# Patient Record
Sex: MALE | Race: WHITE | NOT HISPANIC OR LATINO | Employment: FULL TIME | URBAN - NONMETROPOLITAN AREA
[De-identification: names, ages, dates, MRNs, and addresses within clinical notes are randomized per-mention and may not be internally consistent; named-entity substitution may affect disease eponyms.]

---

## 2017-01-12 ENCOUNTER — TELEPHONE (OUTPATIENT)
Dept: GASTROENTEROLOGY | Facility: CLINIC | Age: 42
End: 2017-01-12

## 2017-01-12 ENCOUNTER — DOCUMENTATION (OUTPATIENT)
Dept: CARDIOLOGY | Facility: CLINIC | Age: 42
End: 2017-01-12

## 2017-01-12 NOTE — TELEPHONE ENCOUNTER
Veronica from Dr. Jackson's office called. I explained we had tried to contact the patient concerning setting up an appointment based on the referral from Dr. Jackson. We had tried on 5 occasions and had left messages without any response. She promised to note their chart that the patient has failed to make an appointment for evaluation.

## 2017-01-12 NOTE — PROGRESS NOTES
Spoke with Kayleen at Dr. Alicea's office. They have attempted to reach patient 4 times (left message to return call) and still unable to make contact regarding gastroenterology referral.

## 2017-01-25 ENCOUNTER — TELEPHONE (OUTPATIENT)
Dept: CARDIOLOGY | Facility: CLINIC | Age: 42
End: 2017-01-25

## 2017-01-25 NOTE — TELEPHONE ENCOUNTER
Vicki from Dr. Alicea's office called to let us know they have been unable to reach Patient to schedule an apt.

## 2017-10-03 ENCOUNTER — OFFICE VISIT (OUTPATIENT)
Dept: PULMONOLOGY | Facility: CLINIC | Age: 42
End: 2017-10-03

## 2017-10-03 VITALS
TEMPERATURE: 97.9 F | SYSTOLIC BLOOD PRESSURE: 117 MMHG | WEIGHT: 315 LBS | HEIGHT: 70 IN | BODY MASS INDEX: 45.1 KG/M2 | DIASTOLIC BLOOD PRESSURE: 76 MMHG | HEART RATE: 61 BPM | OXYGEN SATURATION: 93 %

## 2017-10-03 DIAGNOSIS — E66.01 MORBID OBESITY WITH BMI OF 45.0-49.9, ADULT (HCC): ICD-10-CM

## 2017-10-03 DIAGNOSIS — G47.33 OSA (OBSTRUCTIVE SLEEP APNEA): Primary | ICD-10-CM

## 2017-10-03 PROCEDURE — 99203 OFFICE O/P NEW LOW 30 MIN: CPT | Performed by: INTERNAL MEDICINE

## 2017-10-03 NOTE — PROGRESS NOTES
Subjective   Luis Sepulveda Jr is a 42 y.o. male who is being seen for Sleep Apnea    History of Present Illness   This 42-year-old gentleman is been referred to us by his primary care provider for evaluation of sleep apnea.  Patient tells me that she was having sleep disturbance for quite some time, approximately 6 years ago he was given pressure therapy after a nocturnal polysomnography revealed presence of obstructive sleep apnea.  He had great results from pressure therapy, sleep was rest for and daytime fatigue and sleepiness was much less.  But lately for the past year or so he is noticing that his symptoms are coming back again.  Now his symptoms are almost like before he was started on pressure therapy.  Patient tells me that he is very was compliant with the machine and feels that he is not getting enough pressure.  Past Medical History:   Diagnosis Date   • Back pain 11/30/2016   • Degenerative disc disease at L5-S1 level 11/30/2016   • Foraminal stenosis of lumbar region 11/30/2016   • Left leg pain 11/30/2016   • Lumbar herniated disc 11/30/2016     No past surgical history on file.  History reviewed. No pertinent family history.   reports that he has never smoked. His smokeless tobacco use includes Snuff. He reports that he does not drink alcohol or use illicit drugs.  No Known Allergies        The following portions of the patient's history were reviewed and updated as appropriate: allergies, current medications, past family history, past medical history, past social history, past surgical history and problem list.    Review of Systems   Constitutional: Positive for fatigue (with increased daytime sleepiness).   HENT:        Loud snoring   Respiratory: Positive for apnea (Witnessed apnea per family/spouse) and shortness of breath (exhustional).    Cardiovascular: Positive for leg swelling.   Neurological: Positive for headaches.   Psychiatric/Behavioral: Positive for sleep disturbance (Fragmented sleep  "with unrefreshed feeling in the morning ).        SLEEP: Loud snoring, fragmented sleep, un refreshed feeling in the morning, increased daytime sleepiness    All other systems reviewed and are negative.      Objective   /76  Pulse 61  Temp 97.9 °F (36.6 °C) (Oral)   Ht 70\" (177.8 cm)  Wt (!) 340 lb (154 kg)  SpO2 93%  BMI 48.78 kg/m2  Physical Exam   Constitutional: He is oriented to person, place, and time.   HENT:   Head: Normocephalic and atraumatic.   Nose: Mucosal edema present.   Eyes: EOM are normal. Pupils are equal, round, and reactive to light.   Neck: Neck supple.   Cardiovascular: Normal rate, regular rhythm and normal heart sounds.    Pulmonary/Chest: He has rhonchi.   Vesicular breath sound bilaterally with prolonged expiratory phase   Abdominal: Soft. Bowel sounds are normal.   Musculoskeletal: Normal range of motion. He exhibits no deformity.   Neurological: He is alert and oriented to person, place, and time.   Skin: Skin is warm and dry.   Psychiatric: He has a normal mood and affect. His behavior is normal.   Nursing note and vitals reviewed.        Radiology:  No Images in the past 120 days found..    Lab Results:  No results found for any previous visit.    Assessment      ICD-10-CM ICD-9-CM   1. ZAIDA (obstructive sleep apnea) G47.33 327.23   2. Morbid obesity with BMI of 45.0-49.9, adult E66.01 278.01    Z68.42 V85.42                DISCUSSION:  This gentleman has an established diagnosis of obstructive sleep apnea and is on pressure therapy for more than 6 years.  His symptoms are now back again.  He feels that his machine may not  be working but his also skeptical about the pressure itself.  He tells me that sometimes he has a feeling that he is not getting enough pressure.    We would send him for a CPAP titration study to determine the optimal pressure requirement at this point and we'll replace his CPAP machine with the new pressure.    Plan    Orders Placed This Encounter "   Procedures   • Polysomnography 4 or More Parameters With CPAP     No orders of the defined types were placed in this encounter.                 Arabella North MD, FCCP, [facility]  Pulmonary, Critical Care, and Sleep Medicine

## 2017-11-08 DIAGNOSIS — E66.01 MORBID OBESITY WITH BMI OF 45.0-49.9, ADULT (HCC): ICD-10-CM

## 2017-11-08 DIAGNOSIS — G47.33 OSA (OBSTRUCTIVE SLEEP APNEA): ICD-10-CM

## 2017-11-22 ENCOUNTER — TELEPHONE (OUTPATIENT)
Dept: PULMONOLOGY | Facility: CLINIC | Age: 42
End: 2017-11-22

## 2017-11-22 DIAGNOSIS — E66.01 MORBID OBESITY WITH BMI OF 45.0-49.9, ADULT (HCC): ICD-10-CM

## 2017-11-22 DIAGNOSIS — G47.33 OBSTRUCTIVE SLEEP APNEA: Primary | ICD-10-CM

## 2017-11-22 NOTE — TELEPHONE ENCOUNTER
PATIENT CALLED AND STATED THAT HIS CPAP MACHINE HAD BROKEN AND STOPPED WORKING.  SPOKE WITH NICOLE ESPINOSA AND HE ORDERED ANOTHER.

## 2017-12-06 ENCOUNTER — OFFICE VISIT (OUTPATIENT)
Dept: PULMONOLOGY | Facility: CLINIC | Age: 42
End: 2017-12-06

## 2017-12-06 VITALS
SYSTOLIC BLOOD PRESSURE: 115 MMHG | OXYGEN SATURATION: 98 % | DIASTOLIC BLOOD PRESSURE: 78 MMHG | TEMPERATURE: 98.2 F | HEART RATE: 65 BPM | HEIGHT: 70 IN | WEIGHT: 315 LBS | BODY MASS INDEX: 45.1 KG/M2

## 2017-12-06 DIAGNOSIS — E66.01 MORBID OBESITY WITH BMI OF 45.0-49.9, ADULT (HCC): ICD-10-CM

## 2017-12-06 DIAGNOSIS — G47.33 OSA (OBSTRUCTIVE SLEEP APNEA): Primary | ICD-10-CM

## 2017-12-06 PROCEDURE — 99213 OFFICE O/P EST LOW 20 MIN: CPT | Performed by: INTERNAL MEDICINE

## 2017-12-06 NOTE — PROGRESS NOTES
Subjective   Luis Sepulveda Jr is a 42 y.o. male who is being seen for Sleep Apnea    History of Present Illness   Patient returns after pressure titration study.  This patient had history of obstructive sleep apnea and was using CPAP but the machine got broke.  We then sent him for a pressure titration study, this was done recently, found out that he needed 15 cm water pressure.  After that his [employer] company supplied him with a new machine.  He is using that for the past few days and has noted great improvement in his symptoms.  Past Medical History:   Diagnosis Date   • Back pain 11/30/2016   • Degenerative disc disease at L5-S1 level 11/30/2016   • Foraminal stenosis of lumbar region 11/30/2016   • Left leg pain 11/30/2016   • Lumbar herniated disc 11/30/2016     No past surgical history on file.  History reviewed. No pertinent family history.   reports that he has never smoked. His smokeless tobacco use includes Snuff. He reports that he does not drink alcohol or use illicit drugs.  No Known Allergies        The following portions of the patient's history were reviewed and updated as appropriate: allergies, current medications, past family history, past medical history, past social history, past surgical history and problem list.    Review of Systems   Constitutional: Negative for appetite change, chills, diaphoresis and unexpected weight change.   HENT: Negative for sore throat, trouble swallowing and voice change.    Eyes: Negative for visual disturbance.   Respiratory: Positive for shortness of breath. Negative for apnea, cough, choking and wheezing.    Cardiovascular: Negative for chest pain, palpitations and leg swelling.   Gastrointestinal: Negative for abdominal pain, constipation, diarrhea, nausea and vomiting.   Endocrine: Negative for cold intolerance, heat intolerance, polydipsia, polyphagia and polyuria.   Genitourinary: Negative for difficulty urinating and dysuria.   Musculoskeletal: Positive for  "arthralgias and back pain. Negative for gait problem.   Skin: Negative for rash and wound.   Neurological: Negative for syncope and light-headedness.   Hematological: Negative for adenopathy.   Psychiatric/Behavioral: Negative for agitation, behavioral problems and confusion.   All other systems reviewed and are negative.      Objective   /78  Pulse 65  Temp 98.2 °F (36.8 °C) (Oral)   Ht 177.8 cm (70\")  Wt (!) 156 kg (343 lb)  SpO2 98%  BMI 49.22 kg/m2  Physical Exam   Constitutional: He is oriented to person, place, and time.   HENT:   Head: Normocephalic and atraumatic.   Nose: Mucosal edema present.   Eyes: EOM are normal. Pupils are equal, round, and reactive to light.   Neck: Neck supple.   Cardiovascular: Normal rate, regular rhythm and normal heart sounds.    Pulmonary/Chest: He has rhonchi.   Vesicular breath sound bilaterally with prolonged expiratory phase   Abdominal: Soft. Bowel sounds are normal.   Musculoskeletal: Normal range of motion. He exhibits no deformity.   Neurological: He is alert and oriented to person, place, and time.   Skin: Skin is warm and dry.   Psychiatric: He has a normal mood and affect. His behavior is normal.   Nursing note and vitals reviewed.        Radiology:  No Images in the past 120 days found..    Lab Results:  No results found for any previous visit.    Assessment      ICD-10-CM ICD-9-CM   1. ZAIDA (obstructive sleep apnea) G47.33 327.23   2. Morbid obesity with BMI of 45.0-49.9, adult E66.01 278.01    Z68.42 V85.42                DISCUSSION:  Patient is tolerating the CPAP mask and has responded very well to the pressure therapy.  Our recommendation is to continue CPAP with pressure of 15 cm water delivered through a full facemask.  We will see him again in approximately 6 months from now.    Plan    No orders of the defined types were placed in this encounter.    No orders of the defined types were placed in this encounter.                 Arabella Ramirez" MD [place], FCCP, FAASM  Pulmonary, Critical Care, and Sleep Medicine

## 2018-07-20 ENCOUNTER — OFFICE VISIT (OUTPATIENT)
Dept: PULMONOLOGY | Facility: CLINIC | Age: 43
End: 2018-07-20

## 2018-07-20 VITALS
DIASTOLIC BLOOD PRESSURE: 75 MMHG | TEMPERATURE: 98.2 F | BODY MASS INDEX: 44.1 KG/M2 | WEIGHT: 315 LBS | HEIGHT: 71 IN | OXYGEN SATURATION: 95 % | HEART RATE: 63 BPM | SYSTOLIC BLOOD PRESSURE: 159 MMHG

## 2018-07-20 DIAGNOSIS — G47.33 OSA (OBSTRUCTIVE SLEEP APNEA): Primary | ICD-10-CM

## 2018-07-20 DIAGNOSIS — E66.01 MORBID OBESITY WITH BMI OF 45.0-49.9, ADULT (HCC): ICD-10-CM

## 2018-07-20 PROCEDURE — 99213 OFFICE O/P EST LOW 20 MIN: CPT | Performed by: INTERNAL MEDICINE

## 2018-07-20 NOTE — PROGRESS NOTES
Subjective   Luis Sepulveda Jr is a 43 y.o. male who is being seen for Sleep Apnea    History of Present Illness   Patient returns for follow-up for sleep apnea.  He is on CPAP with pressure of 15 cm water.  Tells me that he is using it on a nightly basis and has seen great improvement.  He said that he sleeps sound and in the morning wakes up refreshed.  He appears very happy about that.  No new complaints today.  Past Medical History:   Diagnosis Date   • Back pain 11/30/2016   • Degenerative disc disease at L5-S1 level 11/30/2016   • Foraminal stenosis of lumbar region 11/30/2016   • Left leg pain 11/30/2016   • Lumbar herniated disc 11/30/2016     History reviewed. No pertinent surgical history.  History reviewed. No pertinent family history.   reports that he has never smoked. His smokeless tobacco use includes Snuff. He reports that he does not drink alcohol or use drugs.  No Known Allergies        Patient has not received a flu shot or a pneumonia vaccination.     The following portions of the patient's history were reviewed and updated as appropriate: allergies, current medications, past family history, past medical history, past social history, past surgical history and problem list.    Review of Systems   Constitutional: Negative for appetite change, chills, diaphoresis and unexpected weight change.   HENT: Negative for sore throat, trouble swallowing and voice change.    Eyes: Negative for visual disturbance.   Respiratory: Positive for shortness of breath. Negative for apnea, cough, choking and wheezing.         Exertional dyspnea   Cardiovascular: Negative for chest pain, palpitations and leg swelling.   Gastrointestinal: Negative for abdominal pain, constipation, diarrhea, nausea and vomiting.   Endocrine: Negative for cold intolerance, heat intolerance, polydipsia, polyphagia and polyuria.   Genitourinary: Negative for difficulty urinating and dysuria.   Musculoskeletal: Negative for gait problem.  "  Skin: Negative for rash and wound.   Neurological: Negative for syncope and light-headedness.   Hematological: Negative for adenopathy.   Psychiatric/Behavioral: Negative for agitation, behavioral problems and confusion.   All other systems reviewed and are negative.      Objective   /75 (BP Location: Left arm, Patient Position: Sitting)   Pulse 63   Temp 98.2 °F (36.8 °C) (Oral)   Ht 180.3 cm (71\")   Wt (!) 156 kg (345 lb)   SpO2 95%   BMI 48.12 kg/m²   Physical Exam   Constitutional: He is oriented to person, place, and time. He appears well-developed and well-nourished.   HENT:   Head: Normocephalic and atraumatic.   Nose: Nose normal.   Mouth/Throat: Oropharynx is clear and moist.   Eyes: Pupils are equal, round, and reactive to light. EOM are normal.   Neck: Normal range of motion. Neck supple.   Cardiovascular: Normal rate, regular rhythm and normal heart sounds.    Pulmonary/Chest: Breath sounds normal. He has no wheezes. He has no rales.   Abdominal: Soft. Bowel sounds are normal.   Protuberant belly from abdominal obesity   Musculoskeletal: Normal range of motion.   Neurological: He is alert and oriented to person, place, and time.   Skin: Skin is warm and dry.   Psychiatric: He has a normal mood and affect. His behavior is normal.   Nursing note and vitals reviewed.        Radiology:  No Images in the past 120 days found..    Lab Results:  No results found for any previous visit.       Assessment      ICD-10-CM ICD-9-CM   1. ZAIDA (obstructive sleep apnea) G47.33 327.23   2. Morbid obesity with BMI of 45.0-49.9, adult (CMS/[facility]) E66.01 278.01    Z68.42 V85.42                DISCUSSION:  This patient has responded very well to CPAP of 15 cm water.  Our recommendation is to continue the same pressure.  We would see him again in approximate 6 months from now.    Obesity is definitely contributing to his current symptoms.  We discussed about this again today  Patient's Body mass index is 48.12 " kg/m². BMI is above normal parameters. Recommendations include: educational material and exercise counseling.      Plan    No orders of the defined types were placed in this encounter.    No orders of the defined types were placed in this encounter.                 Arabella North MD, FCCP, FAASM  Pulmonary, Critical Care, and Sleep Medicine